# Patient Record
Sex: FEMALE | Race: WHITE | URBAN - METROPOLITAN AREA
[De-identification: names, ages, dates, MRNs, and addresses within clinical notes are randomized per-mention and may not be internally consistent; named-entity substitution may affect disease eponyms.]

---

## 2018-03-30 ENCOUNTER — OFFICE VISIT (OUTPATIENT)
Dept: URGENT CARE | Facility: RETAIL CLINIC | Age: 58
End: 2018-03-30
Payer: COMMERCIAL

## 2018-03-30 VITALS
TEMPERATURE: 98.5 F | HEART RATE: 62 BPM | DIASTOLIC BLOOD PRESSURE: 71 MMHG | OXYGEN SATURATION: 97 % | SYSTOLIC BLOOD PRESSURE: 118 MMHG

## 2018-03-30 DIAGNOSIS — J01.90 ACUTE SINUSITIS WITH COEXISTING CONDITION, NEED PROPHYLACTIC TREATMENT: Primary | ICD-10-CM

## 2018-03-30 DIAGNOSIS — J30.1 ACUTE NONSEASONAL ALLERGIC RHINITIS DUE TO POLLEN: ICD-10-CM

## 2018-03-30 PROBLEM — M85.88 OSTEOPENIA OF SPINE: Status: ACTIVE | Noted: 2017-11-22

## 2018-03-30 PROBLEM — J30.9 RHINITIS, ALLERGIC: Status: ACTIVE | Noted: 2017-10-26

## 2018-03-30 PROBLEM — F33.42 MAJOR DEPRESSIVE DISORDER, RECURRENT EPISODE, IN FULL REMISSION (H): Status: ACTIVE | Noted: 2017-10-26

## 2018-03-30 PROBLEM — S82.892A CLOSED LEFT ANKLE FRACTURE: Status: ACTIVE | Noted: 2017-01-13

## 2018-03-30 PROCEDURE — 99213 OFFICE O/P EST LOW 20 MIN: CPT | Performed by: PHYSICIAN ASSISTANT

## 2018-03-30 RX ORDER — ALENDRONATE SODIUM 70 MG/1
70 TABLET ORAL
COMMUNITY
Start: 2017-11-28

## 2018-03-30 RX ORDER — FLUTICASONE PROPIONATE 50 MCG
2 SPRAY, SUSPENSION (ML) NASAL DAILY
Qty: 1 BOTTLE | Refills: 6 | Status: SHIPPED | OUTPATIENT
Start: 2018-03-30

## 2018-03-30 RX ORDER — CITALOPRAM HYDROBROMIDE 20 MG/1
20 TABLET ORAL
COMMUNITY
Start: 2017-11-22

## 2018-03-30 NOTE — NURSING NOTE
Chief Complaint   Patient presents with     Sinus Problem     at least 1 week; pressure along both sides of nose, cheeks     Nasal Congestion     stuffy, runny     Cough     Headache     off and on from facial pressure     Chills     Sweats       Initial /71 (BP Location: Left arm)  Pulse 62  Temp 98.5  F (36.9  C) (Oral)  SpO2 97% Estimated body mass index is 26.37 kg/(m^2) as calculated from the following:    Height as of 12/29/16: 5' (1.524 m).    Weight as of 12/29/16: 135 lb (61.2 kg).  Medication Reconciliation: complete

## 2018-03-30 NOTE — PROGRESS NOTES
Chief Complaint   Patient presents with     Sinus Problem     at least 1 week; pressure along both sides of nose, cheeks     Nasal Congestion     stuffy, runny     Cough     Headache     off and on from facial pressure     Chills     Sweats        SUBJECTIVE:  Yoana Howard is a 57 year old female here with concerns about sinus infection.  She states onset of symptoms were at least 1 week(s) ago.  She has had maxillary pressure. Course of illness is worsening. Severity moderate  Current and Associated symptoms: nasal congestion, rhinorrhea, cough , facial pain/pressure, headache and post-nasal drainage, chills, sweats  Predisposing factors include HX of recurrent sinusitis, last sinus infx 09/2017 treated at [facility] office and recent illness, allergies  Recent treatment has included: 1. flonase - almost out, 2. sudafed - which seemed to dry her up too much, so she stopped using it    Past Medical History:   Diagnosis Date     Anxiety      History of basal cell carcinoma 9/5/2011     Osteopenia of spine 11/22/2017     Sinusitis, acute 10/26/2017     Current Outpatient Prescriptions   Medication Sig Dispense Refill     Omega-3 Fatty Acids (FISH OIL PO)        fluticasone (FLONASE) 50 MCG/ACT spray Spray 2 sprays into both nostrils daily 1 Bottle 6     amoxicillin-clavulanate (AUGMENTIN) 875-125 MG per tablet Take 1 tablet by mouth 2 times daily for 10 days 20 tablet 0     alendronate (FOSAMAX) 70 MG tablet 70 mg TAKE 1 TABLET BY MOUTH ONCE A WEEK IN THE MORNING, AT LEAST 30 MINUTES PRIOR TO THE FIRST FOOD OF THE DAY       citalopram (CELEXA) 20 MG tablet Take 20 mg by mouth       Multiple Vitamins-Minerals (MULTIVITAMIN ADULTS 50+ PO)        Probiotic Product (PROBIOTIC DAILY PO)        Glucosamine HCl (GLUCOSAMINE PO)        [DISCONTINUED] Alendronate Sodium (FOSAMAX PO)        [DISCONTINUED] Citalopram Hydrobromide (CELEXA PO)           Allergies   Allergen Reactions     No Clinical Screening - See Comments       Pollens (trees, grasses, flowers)  Seasonal  Smoke        History   Smoking Status     Never Smoker   Smokeless Tobacco     Not on file       ROS:  CONSTITUTIONAL:POSITIVE  for chills and sweats and NEGATIVE  for fever   ENT/MOUTH: POSITIVE for Hx sinus infections, postnasal drainage, rhinorrhea-purulent and sinus pressure and NEGATIVE for ear pain bilateral  RESP:POSITIVE for clearing/cough-non productive and NEGATIVE for wheezing    OBJECTIVE:  /71 (BP Location: Left arm)  Pulse 62  Temp 98.5  F (36.9  C) (Oral)  SpO2 97%  Exam:GENERAL APPEARANCE: alert, mildly ill appearing  EYES: conjunctiva clear  HENT: TM's normal bilaterally, nasal turbinates erythematous, swollen, rhinorrhea yellow, oral mucous membranes moist, PMD, mild erythema noted, maxillary sinus tenderness. Post nasal drainage noted in the pharynx.  NECK: supple, nontender, no lymphadenopathy  RESP: lungs clear to auscultation - no rales, rhonchi or wheezes  CV: regular rates and rhythm, normal S1 S2, no murmur noted  SKIN: no suspicious lesions or rashes    ASSESSMENT:  (J01.90) Acute sinusitis with coexisting condition, need prophylactic treatment    (J30.1) Acute nonseasonal allergic rhinitis due to pollen, history     PLAN:  fluticasone (FLONASE) 50 MCG/ACT spray, - use for sinus and allergies issues - refills give  amoxicillin-clavulanate (AUGMENTIN) 875-125 MG per tablet  Take antibiotic as directed  Use flonase daily  Probiotic or yogurt during antibiotic course  Apply warm facial compresses/packs for 5-10 minutes three times daily.  Drink plenty of fluids- 6 to 10 glasses of liquids each day. Rest.  Saline drops or nasal sprays as needed.   May use netti pot as needed. Do not use tap water. May use filtered or distilled water.  Steam treatments or humidifier.  Sudafed (decongestant) for congestion.  Mucinex (guaifenesin) to thin out secretions.  Tylenol or ibuprofen as needed for pain or fever  Follow up at your primary care clinic  for increasing pain, high fever, vision changes, worsening symptoms, or no relief from symptoms after 7-10 days.  Please follow up with primary care provider if not improving, worsening or new symptoms or for any adverse reactions to medications.     Neema Baeza PA-C  [facility] [place]

## 2018-03-30 NOTE — PATIENT INSTRUCTIONS
Take antibiotic as directed  Use flonase daily  Probiotic or yogurt during antibiotic course  Apply warm facial compresses/packs for 5-10 minutes three times daily.  Drink plenty of fluids- 6 to 10 glasses of liquids each day. Rest.  Saline drops or nasal sprays as needed.   May use netti pot as needed. Do not use tap water. May use filtered or distilled water.  Steam treatments or humidifier.  Sudafed (decongestant) for congestion.  Mucinex (guaifenesin) to thin out secretions.  Tylenol or ibuprofen as needed for pain or fever  Follow up at your primary care clinic for increasing pain, high fever, vision changes, worsening symptoms, or no relief from symptoms after 7-10 days.  Please follow up with primary care provider if not improving, worsening or new symptoms or for any adverse reactions to medications.